# Patient Record
Sex: FEMALE | Race: OTHER | Employment: OTHER | URBAN - METROPOLITAN AREA
[De-identification: names, ages, dates, MRNs, and addresses within clinical notes are randomized per-mention and may not be internally consistent; named-entity substitution may affect disease eponyms.]

---

## 2017-01-05 PROBLEM — E05.90 HYPERTHYROIDISM: Status: ACTIVE | Noted: 2017-01-05

## 2017-01-05 PROBLEM — E04.2 MULTIPLE THYROID NODULES: Status: ACTIVE | Noted: 2017-01-05

## 2017-02-27 PROBLEM — M79.7 FIBROMYALGIA: Status: ACTIVE | Noted: 2017-02-27

## 2017-02-27 PROBLEM — E55.9 VITAMIN D DEFICIENCY: Status: ACTIVE | Noted: 2017-02-27

## 2017-02-27 PROBLEM — F32.A DEPRESSION: Status: ACTIVE | Noted: 2017-02-27

## 2017-02-27 PROBLEM — I01.1 RHEUMATOID AORTITIS: Status: ACTIVE | Noted: 2017-02-27

## 2017-02-27 PROBLEM — I10 HYPERTENSION: Status: ACTIVE | Noted: 2017-02-27

## 2017-02-27 PROBLEM — M85.80 OSTEOPENIA: Status: ACTIVE | Noted: 2017-02-27

## 2017-02-27 PROBLEM — K21.9 GERD (GASTROESOPHAGEAL REFLUX DISEASE): Status: ACTIVE | Noted: 2017-02-27

## 2017-02-27 PROBLEM — E78.5 HYPERLIPIDEMIA: Status: ACTIVE | Noted: 2017-02-27

## 2017-02-27 PROBLEM — M06.9 RHEUMATOID ARTHRITIS (HCC): Status: ACTIVE | Noted: 2017-02-27

## 2017-02-27 PROBLEM — E66.9 OBESE: Status: ACTIVE | Noted: 2017-02-27

## 2017-07-16 ENCOUNTER — HOSPITAL ENCOUNTER (EMERGENCY)
Age: 57
Discharge: HOME | End: 2017-07-16
Attending: EMERGENCY MEDICINE
Payer: MEDICARE

## 2017-07-16 ENCOUNTER — APPOINTMENT (OUTPATIENT)
Dept: CT IMAGING | Age: 57
End: 2017-07-16
Attending: EMERGENCY MEDICINE
Payer: MEDICARE

## 2017-07-16 VITALS
OXYGEN SATURATION: 99 % | SYSTOLIC BLOOD PRESSURE: 124 MMHG | HEIGHT: 60 IN | BODY MASS INDEX: 31.02 KG/M2 | HEART RATE: 62 BPM | TEMPERATURE: 98.2 F | RESPIRATION RATE: 16 BRPM | WEIGHT: 158 LBS | DIASTOLIC BLOOD PRESSURE: 72 MMHG

## 2017-07-16 DIAGNOSIS — H57.89 RED EYE: Primary | ICD-10-CM

## 2017-07-16 PROBLEM — H10.32 ACUTE CONJUNCTIVITIS OF LEFT EYE: Status: ACTIVE | Noted: 2017-07-16

## 2017-07-16 PROBLEM — R51.9 HEADACHE: Status: ACTIVE | Noted: 2017-07-16

## 2017-07-16 PROBLEM — R51.9 FACIAL PAIN, ACUTE: Status: ACTIVE | Noted: 2017-07-16

## 2017-07-16 PROBLEM — H54.7 DECREASED VISION: Status: ACTIVE | Noted: 2017-07-16

## 2017-07-16 LAB
ALBUMIN SERPL BCP-MCNC: 3.8 G/DL (ref 3.5–5)
ALBUMIN/GLOB SERPL: 0.9 {RATIO} (ref 1.2–3.5)
ALP SERPL-CCNC: 114 U/L (ref 50–136)
ALT SERPL-CCNC: 20 U/L (ref 12–65)
ANION GAP BLD CALC-SCNC: 9 MMOL/L (ref 7–16)
AST SERPL W P-5'-P-CCNC: 16 U/L (ref 15–37)
BASOPHILS # BLD AUTO: 0 K/UL (ref 0–0.2)
BASOPHILS # BLD: 0 % (ref 0–2)
BILIRUB SERPL-MCNC: 0.3 MG/DL (ref 0.2–1.1)
BUN SERPL-MCNC: 10 MG/DL (ref 6–23)
CALCIUM SERPL-MCNC: 9 MG/DL (ref 8.3–10.4)
CHLORIDE SERPL-SCNC: 107 MMOL/L (ref 98–107)
CO2 SERPL-SCNC: 28 MMOL/L (ref 21–32)
CREAT SERPL-MCNC: 0.74 MG/DL (ref 0.6–1)
DIFFERENTIAL METHOD BLD: ABNORMAL
EOSINOPHIL # BLD: 0.1 K/UL (ref 0–0.8)
EOSINOPHIL NFR BLD: 1 % (ref 0.5–7.8)
ERYTHROCYTE [DISTWIDTH] IN BLOOD BY AUTOMATED COUNT: 14.3 % (ref 11.9–14.6)
ERYTHROCYTE [SEDIMENTATION RATE] IN BLOOD: 10 MM/HR (ref 0–30)
GLOBULIN SER CALC-MCNC: 4.2 G/DL (ref 2.3–3.5)
GLUCOSE SERPL-MCNC: 89 MG/DL (ref 65–100)
HCT VFR BLD AUTO: 41.2 % (ref 35.8–46.3)
HGB BLD-MCNC: 13.8 G/DL (ref 11.7–15.4)
IMM GRANULOCYTES # BLD: 0 K/UL (ref 0–0.5)
IMM GRANULOCYTES NFR BLD AUTO: 0.2 % (ref 0–5)
LYMPHOCYTES # BLD AUTO: 36 % (ref 13–44)
LYMPHOCYTES # BLD: 2.2 K/UL (ref 0.5–4.6)
MCH RBC QN AUTO: 27.4 PG (ref 26.1–32.9)
MCHC RBC AUTO-ENTMCNC: 33.5 G/DL (ref 31.4–35)
MCV RBC AUTO: 81.9 FL (ref 79.6–97.8)
MONOCYTES # BLD: 0.5 K/UL (ref 0.1–1.3)
MONOCYTES NFR BLD AUTO: 7 % (ref 4–12)
NEUTS SEG # BLD: 3.5 K/UL (ref 1.7–8.2)
NEUTS SEG NFR BLD AUTO: 56 % (ref 43–78)
PLATELET # BLD AUTO: 253 K/UL (ref 150–450)
PMV BLD AUTO: 10.6 FL (ref 10.8–14.1)
POTASSIUM SERPL-SCNC: 3.8 MMOL/L (ref 3.5–5.1)
PROT SERPL-MCNC: 8 G/DL (ref 6.3–8.2)
RBC # BLD AUTO: 5.03 M/UL (ref 4.05–5.25)
SODIUM SERPL-SCNC: 144 MMOL/L (ref 136–145)
WBC # BLD AUTO: 6.3 K/UL (ref 4.3–11.1)

## 2017-07-16 PROCEDURE — 99284 EMERGENCY DEPT VISIT MOD MDM: CPT | Performed by: EMERGENCY MEDICINE

## 2017-07-16 PROCEDURE — 85652 RBC SED RATE AUTOMATED: CPT | Performed by: EMERGENCY MEDICINE

## 2017-07-16 PROCEDURE — 85025 COMPLETE CBC W/AUTO DIFF WBC: CPT | Performed by: EMERGENCY MEDICINE

## 2017-07-16 PROCEDURE — 80053 COMPREHEN METABOLIC PANEL: CPT | Performed by: EMERGENCY MEDICINE

## 2017-07-16 PROCEDURE — 70450 CT HEAD/BRAIN W/O DYE: CPT

## 2017-07-16 RX ORDER — TETRACAINE HYDROCHLORIDE 5 MG/ML
1 SOLUTION OPHTHALMIC
Status: DISCONTINUED | OUTPATIENT
Start: 2017-07-16 | End: 2017-07-16 | Stop reason: HOSPADM

## 2017-07-16 RX ORDER — ERYTHROMYCIN 5 MG/G
OINTMENT OPHTHALMIC
Qty: 1 TUBE | Refills: 0 | Status: SHIPPED | OUTPATIENT
Start: 2017-07-16 | End: 2017-07-19 | Stop reason: ALTCHOICE

## 2017-07-16 NOTE — ED PROVIDER NOTES
HPI Comments: Patient is a 58-year-old female with redness in her left eye. Patient states redness began yesterday morning, associated with some tenderness and pain to her left face and pain with eye movement. Denies any injuries or trauma, denies any fevers, no changes in vision, does endorse some nausea without vomiting, no further complaints. Patient is a 59 y.o. female presenting with eye irritation. The history is provided by the patient. No  was used. Red Eye    Associated symptoms include eye redness and pain. Pertinent negatives include no nausea, no vomiting, no weakness and no fever. Past Medical History:   Diagnosis Date    Depression 2/27/2017    Fibromyalgia     GERD (gastroesophageal reflux disease)     Hyperlipidemia     Hypertension     Hyperthyroidism     Multiple thyroid nodules 1/5/2017    Obese 2/27/2017    Osteopenia 2/27/2017    [facility]     Dr. Ayden Anthony Vitamin D deficiency 2/27/2017       Past Surgical History:   Procedure Laterality Date    HX APPENDECTOMY  2004    HX CARPAL TUNNEL RELEASE Bilateral 2004    HX HEMORRHOIDECTOMY      HX HYSTERECTOMY  2004    complete    HX OTHER SURGICAL      Hemangioma excision         Family History:   Problem Relation Age of Onset    Hypertension Mother     No Known Problems Father     Thyroid Disease Neg Hx     Diabetes Neg Hx        Social History     Social History    Marital status:      Spouse name: N/A    Number of children: N/A    Years of education: N/A     Occupational History    Not on file. Social History Main Topics    Smoking status: Never Smoker    Smokeless tobacco: Never Used    Alcohol use No    Drug use: No    Sexual activity: Not on file     Other Topics Concern    Not on file     Social History Narrative         ALLERGIES: Iohexol and Contrast agent [iodine]    Review of Systems   Constitutional: Negative for chills and fever.    HENT: Negative for rhinorrhea and sore throat. Eyes: Positive for pain and redness. Negative for visual disturbance. Respiratory: Negative for cough and shortness of breath. Cardiovascular: Negative for chest pain and leg swelling. Gastrointestinal: Negative for abdominal pain, diarrhea, nausea and vomiting. Genitourinary: Negative for dysuria. Musculoskeletal: Negative for back pain and neck pain. Skin: Negative for rash. Neurological: Negative for weakness and headaches. Psychiatric/Behavioral: The patient is not nervous/anxious. Vitals:    07/16/17 1438   BP: 155/76   Pulse: 68   Resp: 16   Temp: 98.3 °F (36.8 °C)   SpO2: 99%   Weight: 71.7 kg (158 lb)   Height: 5' (1.524 m)            Physical Exam   Constitutional: She is oriented to person, place, and time. She appears well-developed and well-nourished. HENT:   Head: Normocephalic. Right Ear: External ear normal.   Left Ear: External ear normal.   Eyes: Conjunctivae and EOM are normal. Pupils are equal, round, and reactive to light. Right eye exhibits no discharge. Left eye exhibits no discharge. Left eye with conjunctival redness, full range of motion of her eyes bilaterally, pupils equal and reactive to light, pupils accomodate appropriately, she does seem to have some pain with movement of her left eye from side to side    IOP 18 on right and 26 on left then 16 on repeat of left. Visual acuity intact 40/40 and negative for fluorosceine uptake. Neck: Normal range of motion. Neck supple. No tracheal deviation present. Cardiovascular: Normal rate, regular rhythm, normal heart sounds and intact distal pulses. No murmur heard. Pulmonary/Chest: Effort normal and breath sounds normal. No respiratory distress. Abdominal: Soft. There is no tenderness. Musculoskeletal: Normal range of motion. Neurological: She is alert and oriented to person, place, and time. No cranial nerve deficit.    Cn 2-12 fully intact, strength 5/5 in all extremities, negative pronator drift, ambulates without difficulty, no focal deficits appreciated other than some sensation deficits on left compared with right upper extremity which she states is \"definitely not new\" and contributes to her chronic fibromyalgia stating it has been like that for \"a long long time\". No acute focal deficits appreciated. Skin: No rash noted. Nursing note and vitals reviewed. MDM  Number of Diagnoses or Management Options  Red eye: new and requires workup     Amount and/or Complexity of Data Reviewed  Clinical lab tests: ordered and reviewed  Tests in the radiology section of CPT®: ordered and reviewed  Tests in the medicine section of CPT®: ordered and reviewed  Review and summarize past medical records: yes    Risk of Complications, Morbidity, and/or Mortality  Presenting problems: high  Diagnostic procedures: high  Management options: high    Patient Progress  Patient progress: stable    ED Course       Procedures    Recent Results (from the past 12 hour(s))   SED RATE, AUTOMATED    Collection Time: 07/16/17  4:53 PM   Result Value Ref Range    Sed rate, automated 10 0 - 30 mm/hr   CBC WITH AUTOMATED DIFF    Collection Time: 07/16/17  4:53 PM   Result Value Ref Range    WBC 6.3 4.3 - 11.1 K/uL    RBC 5.03 4.05 - 5.25 M/uL    HGB 13.8 11.7 - 15.4 g/dL    HCT 41.2 35.8 - 46.3 %    MCV 81.9 79.6 - 97.8 FL    MCH 27.4 26.1 - 32.9 PG    MCHC 33.5 31.4 - 35.0 g/dL    RDW 14.3 11.9 - 14.6 %    PLATELET 164 776 - 652 K/uL    MPV 10.6 (L) 10.8 - 14.1 FL    DF AUTOMATED      NEUTROPHILS 56 43 - 78 %    LYMPHOCYTES 36 13 - 44 %    MONOCYTES 7 4.0 - 12.0 %    EOSINOPHILS 1 0.5 - 7.8 %    BASOPHILS 0 0.0 - 2.0 %    IMMATURE GRANULOCYTES 0.2 0.0 - 5.0 %    ABS. NEUTROPHILS 3.5 1.7 - 8.2 K/UL    ABS. LYMPHOCYTES 2.2 0.5 - 4.6 K/UL    ABS. MONOCYTES 0.5 0.1 - 1.3 K/UL    ABS. EOSINOPHILS 0.1 0.0 - 0.8 K/UL    ABS. BASOPHILS 0.0 0.0 - 0.2 K/UL    ABS. IMM.  GRANS. 0.0 0.0 - 0.5 K/UL METABOLIC PANEL, COMPREHENSIVE    Collection Time: 07/16/17  4:53 PM   Result Value Ref Range    Sodium 144 136 - 145 mmol/L    Potassium 3.8 3.5 - 5.1 mmol/L    Chloride 107 98 - 107 mmol/L    CO2 28 21 - 32 mmol/L    Anion gap 9 7 - 16 mmol/L    Glucose 89 65 - 100 mg/dL    BUN 10 6 - 23 MG/DL    Creatinine 0.74 0.6 - 1.0 MG/DL    GFR est AA >60 >60 ml/min/1.73m2    GFR est non-AA >60 >60 ml/min/1.73m2    Calcium 9.0 8.3 - 10.4 MG/DL    Bilirubin, total 0.3 0.2 - 1.1 MG/DL    ALT (SGPT) 20 12 - 65 U/L    AST (SGOT) 16 15 - 37 U/L    Alk. phosphatase 114 50 - 136 U/L    Protein, total 8.0 6.3 - 8.2 g/dL    Albumin 3.8 3.5 - 5.0 g/dL    Globulin 4.2 (H) 2.3 - 3.5 g/dL    A-G Ratio 0.9 (L) 1.2 - 3.5       Ct Head Wo Cont    Result Date: 7/16/2017  CT of the head without contrast. CLINICAL INDICATION: Left eye redness and headache PROCEDURE: Serial thin section axial images are obtained from the cranial vertex through the skull base without the administration of intravenous contrast. Radiation dose reduction techniques were used for this study. Our CT scanners use one or all of the following: Automated exposure control, adjusted of the mA and/or kV according to patient size, iterative reconstruction COMPARISON: None. FINDINGS: There is no acute intracranial hemorrhage, mass, or mass effect. No abnormal extra-axial fluid collections identified. There is no hydrocephalus. The basilar cisterns are widely patent. The gray-white matter brain parenchymal interface is well-maintained. No skull fracture or aggressive osseous lesion noted. The mastoid air cells and included paranasal sinuses are clear.      IMPRESSION: Negative head CT       60 yo female with red eye:     Labs and imaging negative as above, IOP measured and without concern for acute glaucoma, eye stained with Fluorosceine and negative for uptake and no rash to face or concern for acute zoster at this time on my exam.  Sed rate normal.  Spoke with Dr. Sonya Lin who agrees feels this is very likely subconjunctival hemorrhage and will see patient first thing tomorrow for recheck and patient to return with any change or worsening vision (visual acuity intact), any nausea or vomiting, fevers or chills, drainage from eye, or any further concerns.

## 2017-07-16 NOTE — DISCHARGE INSTRUCTIONS
IF YOU DEVELOP CHANGES IN VISION, ANY RASH ON YOUR FACE AT ALL, ANY FEVERS, ANY DRAINAGE, ANY CHANGE OR WORSENING PAIN, ANY FURTHER CONCERNS THEN PLEASE RETURN IMMEDIATELY TO THE EMERGENCY DEPARTMENT. Subconjunctival Hemorrhage: Care Instructions  Your Care Instructions    Sometimes small blood vessels in the white of the eye can break, causing a red spot or speck. This is called a subconjunctival hemorrhage. The blood vessels may break when you sneeze, cough, vomit, strain, or bend over. Sometimes there is no clear cause. The blood may look alarming, especially if the spot is large. If there is no pain or vision change, there is usually no reason to worry, and the blood slowly will go away on its own in 2 to 3 weeks. Follow-up care is a key part of your treatment and safety. Be sure to make and go to all appointments, and call your doctor if you are having problems. It's also a good idea to know your test results and keep a list of the medicines you take. How can you care for yourself at home? · Watch for changes in your eye. It is normal for the red spot on your eyeball to change color as it heals. Just like a bruise on your skin, it may change from red to brown to purple to yellow. · Do not take aspirin or products that contain aspirin, which can increase bleeding. Use acetaminophen (Tylenol) if you need pain relief for another problem. · Do not take two or more pain medicines at the same time unless the doctor told you to. Many pain medicines have acetaminophen, which is Tylenol. Too much acetaminophen (Tylenol) can be harmful. When should you call for help? Call your doctor now or seek immediate medical care if:  · You have signs of an eye infection, such as:  ¨ Pus or thick discharge coming from the eye. ¨ Redness or swelling around the eye. ¨ A fever. · You see blood over the black part of your eye (pupil). · You have any changes or problems in your vision.   · You have any pain in your eye.  Watch closely for changes in your health, and be sure to contact your doctor if:  · You do not get better as expected. Where can you learn more? Go to http://kamini-maynor.info/. Enter H907 in the search box to learn more about \"Subconjunctival Hemorrhage: Care Instructions. \"  Current as of: 2017  Content Version: 11.3  © 9424-4430 [employer]. Care instructions adapted under license by [employer] (which disclaims liability or warranty for this information). If you have questions about a medical condition or this instruction, always ask your healthcare professional. [place] [address] any warranty or liability for your use of this information. Erythromycin (Into the eye)   Erythromycin ([id number])  Treats eye infections. Also treats or prevents eye infections in  infants. This medicine is a macrolide antibiotic. Brand Name(s): [employer] Rx Erythromycin   There may be other brand names for this medicine. When This Medicine Should Not Be Used: This medicine is not right for everyone. Do not use it if you had an allergic reaction to erythromycin. How to Use This Medicine:   Ointment  · Your doctor will tell you how much medicine to use. Do not use more than directed. · Wash your hands with soap and water before and after using this medicine. · To use the ointment: Hold the tip of the tube close to your eye with the other hand. Avoid touching the tip of the tube to your eye or finger. Squeeze a ribbon of ointment into the pocket between your lower lid and eyeball. Close your eyes for 1 to 2 minutes. Wipe the tip with a clean tissue and close the tube tightly. Keep the tube tightly closed when you are not using it. · Missed dose: Use the medicine as soon as you can. If it is almost time for your next dose, wait until then to use the medicine and skip the missed dose.  Do not use extra medicine to make up for a missed dose.  · Store the medicine at room temperature, away from heat and direct light. Do not freeze. Drugs and Foods to Avoid:   Ask your doctor or pharmacist before using any other medicine, including over-the-counter medicines, vitamins, and herbal products. · Some medicines can affect how erythromycin works. Tell your doctor if you are using any other products for your eye. Warnings While Using This Medicine:   · Tell your doctor if you are pregnant or breastfeeding, or if you have other eye problems such as glaucoma or cataracts. · Call your doctor if your symptoms do not improve or if they get worse. · Keep all medicine out of the reach of children. Never share your medicine with anyone. Possible Side Effects While Using This Medicine:   Call your doctor right away if you notice any of these side effects:  · Allergic reaction: Itching or hives, swelling in your face or hands, swelling or tingling in your mouth or throat, chest tightness, trouble breathing  · New or severe eye irritation  If you notice these less serious side effects, talk with your doctor:   · Mild eye redness or irritation  If you notice other side effects that you think are caused by this medicine, tell your doctor. Call your doctor for medical advice about side effects. You may report side effects to FDA at 8-669-FDA-2892  © 2017 [facility] Information is for End User's use only and may not be sold, redistributed or otherwise used for commercial purposes. The above information is an  only. It is not intended as medical advice for individual conditions or treatments. Talk to your doctor, nurse or pharmacist before following any medical regimen to see if it is safe and effective for you.

## 2018-08-13 PROBLEM — F41.9 ANXIETY AND DEPRESSION: Status: ACTIVE | Noted: 2018-08-13

## 2018-08-13 PROBLEM — F32.A ANXIETY AND DEPRESSION: Status: ACTIVE | Noted: 2018-08-13

## 2018-08-13 PROBLEM — F32.1 MDD (MAJOR DEPRESSIVE DISORDER), SINGLE EPISODE, MODERATE (HCC): Status: ACTIVE | Noted: 2018-08-13

## 2018-08-13 PROBLEM — Z86.59 HISTORY OF PANIC ATTACKS: Status: ACTIVE | Noted: 2018-08-13

## 2018-08-28 ENCOUNTER — HOSPITAL ENCOUNTER (OUTPATIENT)
Dept: MAMMOGRAPHY | Age: 58
Discharge: HOME | End: 2018-08-28
Payer: MEDICARE

## 2018-08-28 DIAGNOSIS — M89.9 DISORDER OF BONE AND CARTILAGE: ICD-10-CM

## 2018-08-28 DIAGNOSIS — M94.9 DISORDER OF BONE AND CARTILAGE: ICD-10-CM

## 2018-08-28 DIAGNOSIS — Z12.31 ENCOUNTER FOR SCREENING MAMMOGRAM FOR MALIGNANT NEOPLASM OF BREAST: ICD-10-CM

## 2018-08-28 PROCEDURE — 77080 DXA BONE DENSITY AXIAL: CPT

## 2018-08-28 PROCEDURE — 77067 SCR MAMMO BI INCL CAD: CPT

## 2019-03-29 ENCOUNTER — HOSPITAL ENCOUNTER (OUTPATIENT)
Dept: ULTRASOUND IMAGING | Age: 59
Discharge: HOME | End: 2019-03-29
Attending: INTERNAL MEDICINE
Payer: MEDICARE

## 2019-03-29 DIAGNOSIS — I73.9 RIGHT LEG CLAUDICATION (HCC): ICD-10-CM

## 2019-03-29 PROCEDURE — 93925 LOWER EXTREMITY STUDY: CPT

## 2019-04-09 ENCOUNTER — HOSPITAL ENCOUNTER (OUTPATIENT)
Dept: PHYSICAL THERAPY | Age: 59
Discharge: HOME | End: 2019-04-09
Attending: INTERNAL MEDICINE
Payer: MEDICARE

## 2019-04-09 DIAGNOSIS — G89.29 CHRONIC BILATERAL LOW BACK PAIN, WITH SCIATICA PRESENCE UNSPECIFIED: ICD-10-CM

## 2019-04-09 DIAGNOSIS — M54.2 NECK PAIN: ICD-10-CM

## 2019-04-09 DIAGNOSIS — M54.5 CHRONIC BILATERAL LOW BACK PAIN, WITH SCIATICA PRESENCE UNSPECIFIED: ICD-10-CM

## 2019-04-09 DIAGNOSIS — M79.7 FIBROMYALGIA: ICD-10-CM

## 2019-04-09 PROCEDURE — 97163 PT EVAL HIGH COMPLEX 45 MIN: CPT

## 2019-04-09 PROCEDURE — 97110 THERAPEUTIC EXERCISES: CPT

## 2019-04-09 NOTE — PROGRESS NOTES
Vidya Villanueva  : 1960  [place] / [place] / Product Type: Medicare /  [address]  at [facility]  [address], [facility] [address]  [phone]   [phone]      OUTPATIENT PHYSICAL THERAPY: Daily Treatment Note 2019  Diagnosis: neck pain, low back pain, firbromyalgia    Pre-treatment Symptoms/Complaints:  Pt reports pain in the neck worse than the low back. She wants to start therapy with the neck. Pain: Initial: Pain Intensity 1: 7  Post Session:  7-8/10   Medications Last Reviewed:  2019  Updated Objective Findings:  See evaluation note from today   TREATMENT:      Evaluation (x)    Therapeutic Exercise   (20 min ):  To decrease pain, improve flexibility and motion, and increase strength. Will provide verbal and manual cues as needed to ensure proper performance of the exercises. Will increase range of motion, resistance and intensity as pt tolerates. Pt was taught, and performed exercises for lumbar extn and cervical retraction extn. Did repeated reps of standing lumbar extn--demonstrated slight improvement in range with more reps. No increase in back pain with ex  Did prolonged prone on elbows. Decreased pain. Did not tolerate press ups due to wrist pain  Was able to do alternating knee flexion in prone  Did cervical retraction seated. Repeated reps did not decrease pain, but did improve ROM  Added extn---good, nearly full range of extn, without increase in pain    Therapeutic Modalities (     ):    HEP: As above; instructions given to patient for all exercises. Treatment/Session Summary:    · Response to Treatment:  pt reported that prone on elbows actually felt pretty good. · Communication/Consultation:  None today  · Equipment provided today:  None today   Provided HEP  · Recommendations/Intent for next treatment session: Next visit will focus on decreasing pain in the neck and low back. Posture education.   Treatment Plan of Care Effective Dates:  4-9-2019 to 7-4-2019  Total Treatment Billable Duration: 55 min  PT Patient Time In/Time Out  Time In: 9806  Time Out: Denise Do 20, PT  Visit number   1    Future Appointments   Date Time Provider Mk Glaser   4/11/2019  1:00 PM Jade Flores MD BSN [id number]   4/16/2019  4:00 PM Magdalene Treadwell, PT [facility]   4/18/2019  4:30 PM Magdalene Treadwell, PT [facility]   4/23/2019  1:45 PM Magdalene Treadwell, PT [facility]   4/25/2019  2:45 PM Magdalene Treadwell, PT [facility]   5/22/2019  3:40 PM Armando Neff MD [facility] [id number]

## 2019-04-09 NOTE — THERAPY EVALUATION
Star Neighbor  : 1960  [place] / Product Type: Medicare /  [address]  at [address], [address]  [phone]   [phone]       OUTPATIENT PHYSICAL THERAPY:Initial Assessment 2019   ICD-10: Treatment Diagnosis: M54.2   Cervicalgia  M54.5  Low back pain  M79.7 Fibromyalgia  Precautions/Allergies:   Iohexol and Contrast agent [iodine]   MD Orders: eval and treat MEDICAL/REFERRING DIAGNOSIS:  Neck pain [M54.2]  Fibromyalgia [M79.7]  Chronic bilateral low back pain, with sciatica presence unspecified [M54.5, G89.29]   DATE OF ONSET: chronic, much worse in the past several months  REFERRING PHYSICIAN: Juan Del Rosario MD  RETURN PHYSICIAN APPOINTMENT: TBD     INITIAL ASSESSMENT:  Ms. José Doan presents with referral for neck and low back pain and fibromyalgia. She reports that her neck pain is the worst and wanted to start with treatment there. From initial assessment it is difficult to tell if her pain is due to a mechanical dysfunction or due to the long standing fibromyalgia. She has never had PT in the past.  She is expected to benefit from skilled PT to help decrease her pain, improve general flexibility and muscle efficiency, improve posture and provide education to help maintain gains made in PT and help prevent or minimize future painful episodes. PROBLEM LIST (Impacting functional limitations):  1. Decreased Strength  2. Decreased ADL/Functional Activities  3. Decreased Ambulation Ability/Technique  4. Increased Pain  5. Decreased Activity Tolerance  6. Decreased [place] with Home Exercise Program INTERVENTIONS PLANNED: (Treatment may consist of any combination of the following)  1. Balance Exercise  2. Home Exercise Program (HEP)  3. Manual Therapy  4. Neuromuscular Re-education/Strengthening  5. Range of Motion (ROM)  6.  Therapeutic Exercise/Strengthening  7. Modalities as needed and appropriate, including pool or taping   TREATMENT PLAN:  Effective Dates: 4/9/2019 TO 7/4/2019 (90 days). Frequency/Duration: 2 times a week for 90 Day(s) and upon reassessment, will adjust frequency and duration as progress indicates. GOALS: (Goals have been discussed and agreed upon with patient.)  Short-Term Functional Goals: Time Frame: 3 weeks:   1. Establish independent initial HEP with no cueing. 2. Pt to report decrease in pain level to allow walking > 15 min without increase in sx  3. Provide education re: posture to help decrease spinal stress  Discharge Goals: Time Frame: 8 weeks     1. Improve score on NDI by 3 points     2. Pt to be able to walk > 20 min without increase in sx in neck or back pain     3. Decrease pain to allow pt to cook a meal without increase in neck or back pain     4. Pt to be independent in comprehensive HEP to help maintain gains made in PT    OUTCOME MEASURE:   Tool Used: Neck Disability Index (NDI)  Score:  Initial: 8/50  Most Recent: X/50 (Date: -- )   Interpretation of Score: The Neck Disability Index is a revised form of the Oswestry Low Back Pain Index and is designed to measure the activities of daily living in person's with neck pain. Each section is scored on a 0-5 scale, 5 representing the greatest disability. The scores of each section are added together for a total score of 50. MEDICAL NECESSITY:   · Patient is expected to demonstrate progress in strength, range of motion and pain management to help restore better quality of life with less pain and better tolerance to activity. .  REASON FOR SERVICES/OTHER COMMENTS:  · Patient continues to require skilled intervention due to the need for specific program of stretching and exercise to help manage her symptoms.   Total Duration:  PT Patient Time In/Time Out  Time In: 3702  Time Out: 9414    Rehabilitation Potential For Stated Goals: [address] [address] [facility] therapy, I certify that the treatment plan above will be carried out by a therapist or under their direction. Thank you for this referral,  Gene Edwards, DEAN     Referring Physician Signature: Glenn Walter MD     Referring Physician Signature: Glenn Walter MD          Date               PAIN/SUBJECTIVE:   Initial: Pain Intensity 1: 7  Post Session:  7/10   HISTORY:   History of Injury/Illness (Reason for Referral):  Pt reports that she has had fibromyalgia for several years, and also has pain in the neck, mostly R side, and the low back. She is referred for treatment for all 3 diagnoses, and she reported that the neck pain is the worst, so treatment will start there. She denies any specific incident that made her neck pain flare up. The pain is essentially constant, and goes down the entire arm to the lateral side of the hand. She reports frequent sensations as if the hand is asleep. She reports that turning her head to the R increases sx, but extn does not. She has had xrays but has not heard the results. Has taken Cymbalta for a long time, and the dose was increased recently due to this flare. Her pain is worse with prolonged walking, reading, household chores. The only thing that makes it better is rest.   Past Medical History/Comorbidities:   Ms. Patricia Somers  has a past medical history of Depression (2/27/2017), Fibromyalgia, GERD (gastroesophageal reflux disease), Hyperlipidemia, Hypertension, Hyperthyroidism, Multiple thyroid nodules (1/5/2017), Obese (2/27/2017), Osteopenia (2/27/2017), Rheumatoid arthritis ([facility] [address] and Vitamin D deficiency (2/27/2017). Ms. Patricia Somers  has a past surgical history that includes hx carpal tunnel release (Bilateral, 2004); hx hysterectomy (2004); hx hemorrhoidectomy; hx appendectomy (2004); hx other surgical; and hx endoscopy (2017). Social History/Living Environment:     lives with her daughter in [facility] on 2rd floor.     Prior Level of Function/Work/Activity:  On disability due to pain. Personal Factors:          Age:  62 y.o. Ambulatory/Rehab Services H2 Model Falls Risk Assessment   Risk Factors:       No Risk Factors Identified Ability to Rise from Chair:       (1)  Pushes up, successful in one attempt   Falls Prevention Plan:       No modifications necessary   Total: (5 or greater = High Risk): 1   ©2010 [facility] [employer]. All Rights Reserved. [facility] [employer] Patent #[id number]. Federal Law prohibits the replication, distribution or use without written permission from [facility] [place]   Current Medications:       Current Outpatient Medications:     simvastatin (ZOCOR) 20 mg tablet, Take 1 Tab by mouth nightly for 90 days. , Disp: 90 Tab, Rfl: 1    DULoxetine (CYMBALTA) 60 mg capsule, One daily  Indications: Disorder characterized by Stiff, Tender & Painful Muscles, Disp: 30 Cap, Rfl: 2    baclofen (LIORESAL) 10 mg tablet, Take 1 Tab by mouth three (3) times daily as needed (for muscle spasm). , Disp: 30 Tab, Rfl: 1    ergocalciferol (ERGOCALCIFEROL) 50,000 unit capsule, Take 1 Cap by mouth every seven (7) days. , Disp: 12 Cap, Rfl: 1    propranolol (INDERAL) 20 mg tablet, Take 1 Tab by mouth daily. , Disp: 90 Tab, Rfl: 1    ketotifen (ZADITOR) 0.025 % (0.035 %) ophthalmic solution, Administer 1 Drop to both eyes two (2) times a day for 90 days. , Disp: 5 mL, Rfl: 3    cetirizine (ZYRTEC) 10 mg tablet, Take 1 Tab by mouth nightly., Disp: 90 Tab, Rfl: 1    amLODIPine (NORVASC) 5 mg tablet, Take 1 Tab by mouth daily for 90 days. , Disp: 90 Tab, Rfl: 1    nitroglycerin (NITROSTAT) 0.3 mg SL tablet, , Disp: , Rfl: 1    meloxicam (MOBIC) 15 mg tablet, Take 1 Tab by mouth daily. , Disp: 30 Tab, Rfl: 0    dicyclomine (BENTYL) 10 mg capsule, Take 10 mg by mouth 4 times daily (before meals and nightly). , Disp: , Rfl:     omeprazole (PRILOSEC) 20 mg capsule, Take 20 mg by mouth daily. , Disp: , Rfl:    Date Last Reviewed:  4/10/2019 Number of Personal Factors/Comorbidities that affect the Plan of Care: 3+: HIGH COMPLEXITY        EXAMINATION:   Observation/Orthostatic Postural Assessment:          Pt is very short, moves easily from sit to stand. Moves all extremities without compensatory motions. General sitting posture is Fair, with slightly rounded shoulders and forward head. Pt's primary language is French and speaks through . ROM:        Cervical AROM:  Rotation R and L----min limitation, min pain with R rotation  Lateral bending:  Nil mckoy, no pain  Protraction: nil mckoy  Retraction:  Mod limitation, pain  Extn: mod limitation, no pain    Standing:  Flexion--nil. Can touch the floor  Extension: mod to min mckoy, causes min pain                                      Strength:     tested  strength:  R ( affected):  0                                         L= 32#                     Special Tests: none  Neurological Screen: pt reported frequent tingling and numbness in the R arm and hand, no specific dermatome or myotome pattern described. Not formally tested on initial assessment  Balance:  NT.   No balance problems observed with bending, transition movements      Body Structures Involved:  1. Nerves  2. Bones  3. Joints  4. Muscles Body Functions Affected:  1. Mental  2. Sensory/Pain  3. Neuromusculoskeletal  4. Movement Related Activities and Participation Affected:  1. General Tasks and Demands  2. Mobility  3. Self Care  4. Domestic Life  5.  Community, Social and [place]   Number of elements (examined above) that affect the Plan of Care: 3: MODERATE COMPLEXITY   CLINICAL PRESENTATION:   Presentation: Evolving clinical presentation with unstable and unpredictable characteristics: HIGH COMPLEXITY   CLINICAL DECISION MAKING:   Use of outcome tool(s) and clinical judgement create a POC that gives a: Difficult prediction of patient's progress: HIGH COMPLEXITY

## 2019-04-16 ENCOUNTER — HOSPITAL ENCOUNTER (OUTPATIENT)
Dept: PHYSICAL THERAPY | Age: 59
Discharge: HOME | End: 2019-04-16
Attending: INTERNAL MEDICINE
Payer: MEDICARE

## 2019-04-16 NOTE — PROGRESS NOTES
Vince Aguilar  : 1960  [place]  [place]: [place] Of [facility] Therapy  [address], [facility] [address]  [phone]   [phone]        OUTPATIENT DAILY NOTE    NAME/AGE/GENDER: Vince Aguilar is a 62 y.o. female. DATE: 2019    Patient called to cancel appointment today due to sickness. Will plan to follow up on next scheduled visit.     Srinivas Rizzo, PT

## 2019-04-18 ENCOUNTER — HOSPITAL ENCOUNTER (OUTPATIENT)
Dept: PHYSICAL THERAPY | Age: 59
Discharge: HOME | End: 2019-04-18
Attending: INTERNAL MEDICINE
Payer: MEDICARE

## 2019-04-19 NOTE — PROGRESS NOTES
Ceasar Guaman  : 1960  [place]  [place]: [place] Of [facility] [address], [facility] [address] [facility] [address]  [phone]   [phone]        OUTPATIENT DAILY NOTE    NAME/AGE/GENDER: Ceasar Guaman is a 62 y.o. female. DATE: 2019    Patient called to cancel appointment yesterday due to still being sick. Will plan to follow up on next scheduled visit.     Jamie Sandhu, PT

## 2019-04-23 ENCOUNTER — HOSPITAL ENCOUNTER (OUTPATIENT)
Dept: PHYSICAL THERAPY | Age: 59
Discharge: HOME | End: 2019-04-23
Attending: INTERNAL MEDICINE
Payer: MEDICARE

## 2019-04-23 PROCEDURE — 97110 THERAPEUTIC EXERCISES: CPT

## 2019-04-23 NOTE — PROGRESS NOTES
Michael Keith  : 1960  [place] / Product Type: Medicare /  [address]  at [place] [address] Therapy  [address], [address]  [phone]   [phone]      OUTPATIENT PHYSICAL THERAPY: Daily Treatment Note 2019  Diagnosis: neck pain, low back pain, firbromyalgia    Pre-treatment Symptoms/Complaints:  Pt reports that her neck pain is about the same. She has been bothered this week by vertigo and could not easily do many of the exercises given last week for her neck. Pain: Initial: Pain Intensity 1: 5  Post Session:  3 to 4ish/10   Medications Last Reviewed:  2019  Updated Objective Findings:   strength was still tested as not registering on dynamometer, but she was able to do gripping and pulling ex with  apparently functional forces. TREATMENT:          Therapeutic Exercise   (55 min ):  To decrease pain, improve flexibility and motion, and increase strength. Will provide verbal and manual cues as needed to ensure proper performance of the exercises. Will increase range of motion, resistance and intensity as pt tolerates. Nustep x 8 min, with all 4s  Reassessed cervical motion--still moderately limited for rotation. Worked on retraction, with min asst, in sitting. Could not progress to extn due to increase in vertigo sx  Pt worked with red therabar--pronation and supination, twisting, holding against resistance  bilat UE ex in standing with yellow sportscord--shoulder extn, rows, punch---all 1 x 12  Did prolonged prone on elbows. Tried to work on retraction from prone--not very effective. .  Did not tolerate press ups due to wrist pain  Was able to do alternating knee flexion in prone  Did cervical retraction seated. Repeated reps did not decrease pain, but did improve ROM      Therapeutic Modalities (     ):    HEP: As above; instructions given to patient for all exercises.  Gave pt green theraband for UE ex at home    Treatment/Session Summary:  Pt tolerated therapy well today. With her fibromyalgia, it is difficult to tease out all of her pain and her responses to therapy. She does seem to think that she is getting better little by little. She has tried to be compliant with exercises from PT but that was complicated by new onset of vertigo. She is expected to continue to benefit from PT to help decrease and manage her painful sx, and improve functional strength. · Response to Treatment:  pt reported that prone on elbows actually felt pretty good. · Communication/Consultation:  None today  · Equipment provided today:  green theraband     · Recommendations/Intent for next treatment session: Next visit will focus on decreasing pain in the neck and low back. Posture education.   Treatment Plan of Care Effective Dates:  4-9-2019 to 7-4-2019  Total Treatment Billable Duration: 55 min  PT Patient Time In/Time Out  Time In: 0145  Time Out: [address], PT  Visit number   1    Future Appointments   Date Time Provider Mk Glaser   4/25/2019  2:45 PM Jennifer Philip PT [facility]   5/22/2019  3:40 PM Cristy Hood MD [address]

## 2019-04-25 ENCOUNTER — HOSPITAL ENCOUNTER (OUTPATIENT)
Dept: PHYSICAL THERAPY | Age: 59
Discharge: HOME | End: 2019-04-25
Attending: INTERNAL MEDICINE
Payer: MEDICARE

## 2019-04-25 PROCEDURE — 97110 THERAPEUTIC EXERCISES: CPT

## 2019-04-25 NOTE — PROGRESS NOTES
Migel Cage  : 1960  [place] / Product Type: Medicare /  [address]  at [place] [address] Therapy  [address], [address]  [phone]   [phone]      OUTPATIENT PHYSICAL THERAPY: Daily Treatment Note 2019  Diagnosis: neck pain, low back pain, fibromyalgia    Pre-treatment Symptoms/Complaints:  Pt reports that her neck pain is about the same. Vertigo has been much better. She reported that she has been some better about her exercises. Pain: Initial: Pain Intensity 1: 5  Post Session:  3 to 4ish/10   Medications Last Reviewed:  2019  Updated Objective Findings:  None Today   TREATMENT:      Therapeutic Exercise   (55 min ):  To decrease pain, improve flexibility and motion, and increase strength. Will provide verbal and manual cues as needed to ensure proper performance of the exercises. Will increase range of motion, resistance and intensity as pt tolerates. UBE x 6 min  Reassessed cervical motion--still moderately limited for rotation. Pt worked with red therabar--pronation and supination, with arms out in front  Received PROM for both shoulders  Did work on hips and low back due to c/o back pain:  LTR, knee to chest.  Tried bridging--could not do this  Supine marching--ok but much more difficult with R hip  Hip abd/add agasint manual resistance in hooklying--not great quality for this ex  bilat UE ex in standing with yellow sportscord--shoulder extn, rows, punch---all 1 x 12  Step ups to 6\" step--1 x 15 each---no specific pain in R hip or leg with steps  Lateral step downs--1 x 15 each      Therapeutic Modalities (     ):    HEP: As above; instructions given to patient for all exercises. Pt has  green theraband for UE ex at home    Treatment/Session Summary:  Pt tolerated therapy well today. With her fibromyalgia, it is difficult to tease out all of her pain and her responses to therapy. Treatment today worked more on the trunk and core than the neck, and she seemed to appreciate that. She does seem to think that she is getting better little by little. She has tried to be compliant with exercises from PT but that was complicated by new onset of vertigo. She is expected to continue to benefit from PT to help decrease and manage her painful sx, and improve functional strength. R hand and arm are stil lnoticeably weaker than L and R leg has more pain and dysfunction than L.  · Response to Treatment:  she seemed to like the standing LE ex. · Communication/Consultation:  None today  · Equipment provided today:  None today     · Recommendations/Intent for next treatment session: Next visit will focus on decreasing pain in the neck and low back. Posture education.   Treatment Plan of Care Effective Dates:  4-9-2019 to 7-4-2019  Total Treatment Billable Duration: 55 min  PT Patient Time In/Time Out  Time In: 0250  Time Out: [address], PT  Visit number   3    Future Appointments   Date Time Provider Mk Glaser   4/30/2019  1:45 PM Virgie Paredes PT [facility]   5/2/2019  4:15 PM Aubrey Scruggs, PT [facility]   5/7/2019  1:00 PM Aubrey Louis, PT [facility]   5/9/2019  3:30 PM Aubrey Louis, PT [facility]   5/22/2019  3:40 PM Alida Goodwin MD [address]

## 2019-04-30 ENCOUNTER — HOSPITAL ENCOUNTER (OUTPATIENT)
Dept: PHYSICAL THERAPY | Age: 59
Discharge: HOME | End: 2019-04-30
Attending: INTERNAL MEDICINE
Payer: MEDICARE

## 2019-04-30 NOTE — PROGRESS NOTES
Ashley Gustafson  : 1960  [place]  [place] Of [facility] Therapy  [address], [facility] [address]  [phone]   [phone]        OUTPATIENT DAILY NOTE    NAME/AGE/GENDER: Ashley Gustafson is a 62 y.o. female. DATE: 2019    Patient called to cancel appointment today due to  No reason given. Will plan to follow up on next scheduled visit.     Alecia Harvey, PT

## 2019-05-02 ENCOUNTER — HOSPITAL ENCOUNTER (OUTPATIENT)
Dept: PHYSICAL THERAPY | Age: 59
Discharge: HOME | End: 2019-05-02
Attending: INTERNAL MEDICINE
Payer: MEDICARE

## 2019-05-02 NOTE — PROGRESS NOTES
Ashley Gustafson  : 1960  [place]  [place] Of [facility] Therapy  [address], [facility] [address]  [phone]   [phone]        OUTPATIENT DAILY NOTE    NAME/AGE/GENDER: Ashley Gustafson is a 62 y.o. female. DATE: 2019    Patient called to cancelr appointment today due to her vertigo being worse today. Will plan to follow up on next scheduled visit.     Alecia Harvey, PT

## 2019-05-07 ENCOUNTER — HOSPITAL ENCOUNTER (OUTPATIENT)
Dept: PHYSICAL THERAPY | Age: 59
Discharge: HOME | End: 2019-05-07
Attending: INTERNAL MEDICINE
Payer: MEDICARE

## 2019-05-07 PROCEDURE — 97110 THERAPEUTIC EXERCISES: CPT

## 2019-05-07 NOTE — PROGRESS NOTES
Jailene Box  : 1960  [place] / Product Type: Medicare /  [address]  at [place] [address] Therapy  [address], [address]  [phone]   [phone]      OUTPATIENT PHYSICAL THERAPY: Daily Treatment Note 2019  Diagnosis: neck pain, low back pain, fibromyalgia    Pre-treatment Symptoms/Complaints:  Pt reports that her neck pain is some better, but her overall body pain seems worse. She indicates moderate pain in the R shoulder and inside of R elbow. Her vertigo is better. Pain: Initial: Pain Intensity 1: 4  Post Session:  3 to 2 wisam/10   Medications Last Reviewed:  2019  Updated Objective Findings:  None Today   TREATMENT:      Therapeutic Exercise   (60 min ):  To decrease pain, improve flexibility and motion, and increase strength. Will provide verbal and manual cues as needed to ensure proper performance of the exercises. Will increase range of motion, resistance and intensity as pt tolerates. Nustep x 16  min  Standing hip abd--19#--1 x 12  Standing hip flex--19#--1 x 12  Standing hip extn--37.5#---1 x 15  Resisted walking--30#--12 trips forward and backward  Standing trunk rotation--yellow sportscord--12 reps each direction    HEP: As above; instructions given to patient for all exercises. Pt has  green theraband for UE ex at home    Treatment/Session Summary:  Pt tolerated therapy well today. She seemed to appreciate the exercise she could do on the Nustep, without any increase in pain. With her fibromyalgia, it is difficult to tease out all of her pain and her responses to therapy. Treatment today worked more on the trunk and core than the neck, and she seemed to appreciate that. She does seem to think that she is getting better little by little. She has tried to be compliant with exercises from PT and now that her vertigo is better she can do that.     She is expected to continue to benefit from PT to help decrease and manage her painful sx, and improve functional strength. R hand and arm are still noticeably weaker than L and R leg has more pain and dysfunction than L.  · Response to Treatment:  she seemed to enjoy the challenge of the resisted walking, and could do that without any pain. · Communication/Consultation:  None today  · Equipment provided today:  None today     · Recommendations/Intent for next treatment session: Next visit will focus on decreasing pain in the neck and low back. Posture education.   Treatment Plan of Care Effective Dates:  4-9-2019 to 7-4-2019  Total Treatment Billable Duration: 60 min  PT Patient Time In/Time Out  Time In: 0100  Time Out: 0200  Felipa Rodriguez, PT  Visit number   4    Future Appointments   Date Time Provider Mk Glaser   5/9/2019  3:30 PM Santos Burroughs, [facility]   5/14/2019  1:00 PM Cooper Scruggs, [facility]   5/21/2019  1:00 PM Cooper Scruggs, PT [facility]

## 2019-05-09 ENCOUNTER — HOSPITAL ENCOUNTER (OUTPATIENT)
Dept: PHYSICAL THERAPY | Age: 59
Discharge: HOME | End: 2019-05-09
Attending: INTERNAL MEDICINE
Payer: MEDICARE

## 2019-05-09 PROCEDURE — 97110 THERAPEUTIC EXERCISES: CPT

## 2019-05-09 NOTE — PROGRESS NOTES
Michael [place]  : 1960  [place] / Product Type: Medicare /  [address]  at [facility] Therapy  [address], [facility] [address]  [phone]   [phone]      OUTPATIENT PHYSICAL THERAPY: Daily Treatment Note 2019  Diagnosis: neck pain, low back pain, fibromyalgia    Pre-treatment Symptoms/Complaints:  Pt reports that her neck pain is some better. Her vertigo is much better. She feels some better overall. Pain: Initial: Pain Intensity 1: 4  Post Session:  3 to 2 wisam/10   Medications Last Reviewed:  2019  Updated Objective Findings:  Able to walk about 1 mile today without too much pain   TREATMENT:      Therapeutic Exercise   (45 min ):  To decrease pain, improve flexibility and motion, and increase strength. Will provide verbal and manual cues as needed to ensure proper performance of the exercises. Will increase range of motion, resistance and intensity as pt tolerates. Nustep x 6  Min  Standing balance on foam half roller, both sides  Standing on foam, biceps curls with 3#  Bridging with blue theraband at knees for abd--1 x 12  Trunk extn with LEs on ball  Standing hip abd--19#--1 x 12  Standing hip flex--19#--1 x 12  Standing hip extn--43#---1 x 15      HEP: As above; instructions given to patient for all exercises. Pt has  green theraband for UE ex at home. Gave pt blue theraband    Treatment/Session Summary:  Pt tolerated therapy well today. With her fibromyalgia, it is difficult to tease out all of her pain and her responses to therapy. Treatment today worked on the trunk and core, and she seemed to appreciate that. She does seem to think that she is getting better little by little. She was able to walk about a mile at the park without significant increase in sx. She has tried to be compliant with exercises from PT and now that her vertigo is better she can do that.     She is expected to continue to benefit from PT to help decrease and manage her painful sx, and improve functional strength. R hand and arm are still noticeably weaker than L and R leg has more pain and dysfunction than L.  · Response to Treatment:  she seemed to enjoy the challenge of the resisted walking, and could do that without any pain. · Communication/Consultation:  None today  · Equipment provided today:  None today     · Recommendations/Intent for next treatment session: Next visit will focus on decreasing pain in the neck and low back. Posture education.   Treatment Plan of Care Effective Dates:  4-9-2019 to 7-4-2019  Total Treatment Billable Duration: 45 min  PT Patient Time In/Time Out  Time In: 0340  Time Out: [facility], PT  Visit number   5    Future Appointments   Date Time Provider Mk Glaser   5/14/2019  1:00 PM Remington Scruggs, PT [facility]   5/21/2019  1:00 PM Remington Scruggs, PT [facility]

## 2019-05-14 ENCOUNTER — HOSPITAL ENCOUNTER (OUTPATIENT)
Dept: PHYSICAL THERAPY | Age: 59
Discharge: HOME | End: 2019-05-14
Attending: INTERNAL MEDICINE
Payer: MEDICARE

## 2019-05-14 NOTE — PROGRESS NOTES
Roz Obrien  : 1960  [place]  [place] [facility]  [address], [address]  [phone]   [phone]        OUTPATIENT DAILY NOTE    NAME/AGE/GENDER: Roz Obrien is a 62 y.o. female. DATE: 2019    Patient called to cancel appointment today due to spraining her ankle. Will plan to follow up on next scheduled visit.     Gene Edwards, PT

## 2019-05-21 ENCOUNTER — HOSPITAL ENCOUNTER (OUTPATIENT)
Dept: PHYSICAL THERAPY | Age: 59
Discharge: HOME | End: 2019-05-21
Attending: INTERNAL MEDICINE
Payer: MEDICARE

## 2019-05-21 PROCEDURE — 97110 THERAPEUTIC EXERCISES: CPT

## 2019-05-21 NOTE — PROGRESS NOTES
Davina Wing  : 1960  [place] / Product Type: Medicare /  [address]  at [place] [address] Therapy  [address], [address]  [phone]   [phone]      OUTPATIENT PHYSICAL THERAPY: Daily Treatment Note 2019  Diagnosis: neck pain, low back pain, fibromyalgia    Pre-treatment Symptoms/Complaints:  Pt reports that her neck pain is significantly better. She did not mention vertigo today. She feels some better overall and thinks today should be her last PT visit. Pain: Initial: Pain Intensity 1: 3  Post Session:  2 to 1  wisam/10   Medications Last Reviewed:  2019  Updated Objective Findings:  pain   TREATMENT:      Therapeutic Exercise   (55 min ):  To decrease pain, improve flexibility and motion, and increase strength. Will provide verbal and manual cues as needed to ensure proper performance of the exercises. Will increase range of motion, resistance and intensity as pt tolerates. Treadmill x 6  Min  Standing balance on foam half roller, both sides  Standing on foam, biceps curls with 3#  Wall ball slides--2 x 15,  Good form  Lots of core ex while sitting on physioball--bouncing, circles, raising 1 leg, resistance against green theraband, shoulder flex and biceps curls against green theraband anchored under her feet  Resisted walking--40#--10 reps forward/backward  Standing hip abd--19#--1 x 12  Standing hip flex--19#--1 x 12  Standing hip extn--43#---1 x 15      HEP: As above; instructions given to patient for all exercises. Pt has green theraband for UE ex at home. Gave pt more green theraband for LE ex    · Treatment/Session Summary:  Pt did very well in therapy today. Treatment today worked on the trunk and core, and she seemed to appreciate that. She reports that she is getting better little by little. She was able to walk about a mile at the park without significant increase in sx.    She has tried to be compliant with exercises from PT and now that her vertigo is better she can do that. She says she  feels much better overall, even though her NDI was much worse. ·   · Response to Treatment:  she seemed to enjoy the challenge of the resisted walking, and could do that without any pain. · Communication/Consultation:  None today  · Equipment provided today:  None today     · Pt is Dc'd today. Treatment Plan of Care Effective Dates:  4-9-2019 to 7-4-2019  Total Treatment Billable Duration: 55 min  PT Patient Time In/Time Out  Time In: 0100  Time Out: 0200  Shilpa Alford, PT  Visit number   6    No future appointments.

## 2019-05-22 NOTE — THERAPY DISCHARGE
Lelo Larson  : 1960  [place] / Product Type: Medicare /  [address]  at [facility]  [address], [address]  [phone]   [phone]       OUTPATIENT PHYSICAL THERAPY:Discharge Summary   2019   ICD-10: Treatment Diagnosis: M54.2   Cervicalgia  M54.5  Low back pain  M79.7 Fibromyalgia  Precautions/Allergies:   Iohexol and Contrast agent [iodine]   MD Orders: eval and treat MEDICAL/REFERRING DIAGNOSIS:  Cervicalgia [M54.2]  Fibromyalgia [M79.7]  Low back pain [M54.5]  Other chronic pain [G89.29]   DATE OF ONSET: chronic, much worse in the past several months  REFERRING PHYSICIAN: Marilyn Boykin MD  RETURN PHYSICIAN APPOINTMENT: TBD      ASSESSMENT:  Ms. Fatou Flores presents with referral for neck and low back pain and fibromyalgia. She reports that her neck pain is the worst and wanted to start with treatment there. From initial assessment it is difficult to tell if her pain is due to a mechanical dysfunction or due to the long standing fibromyalgia. She has never had PT in the past.  She is expected to benefit from skilled PT to help decrease her pain, improve general flexibility and muscle efficiency, improve posture and provide education to help maintain gains made in PT and help prevent or minimize future painful episodes. -- Discharge   Pt attended 6 visits to PT initially for neck pain worse than back pain, and as neck pain was better therapy addressed more of her back pain. At time of DC she had full painfree ROM of the neck, and her headaches were much more infrequent and intense. She also had less back pain, but her R arm and leg remained slightly weaker than the L, presumably due to fibromyalgia pain. She had resumed walking in her neighborhood and can walk a mile without pain.     The NDI she filled out at [facility] was worse than initially, making me suspect her initial reporting was way inaccurate. Her treatments were all done with help from . Pt feel ready for DC, and therapy is over today. PROBLEM LIST (Impacting functional limitations):  1. Decreased Strength  2. Decreased ADL/Functional Activities  3. Decreased Ambulation Ability/Technique  4. Increased Pain  5. Decreased Activity Tolerance  6. Decreased [place] with Home Exercise Program INTERVENTIONS PLANNED: (Treatment may consist of any combination of the following)  1. Balance Exercise  2. Home Exercise Program (HEP)  3. Manual Therapy  4. Neuromuscular Re-education/Strengthening  5. Range of Motion (ROM)  6. Therapeutic Exercise/Strengthening  7. Modalities as needed and appropriate, including pool or taping   TREATMENT PLAN:  Effective Dates: 4/9/2019 TO 7/4/2019 (90 days). Frequency/Duration: Pt is DC'd from therapy today. GOALS: (Goals have been discussed and agreed upon with patient.)  Short-Term Functional Goals: Time Frame: 3 weeks:--- all STGS were met   1. Establish independent initial HEP with no cueing. 2. Pt to report decrease in pain level to allow walking > 15 min without increase in sx  3. Provide education re: posture to help decrease spinal stress  Discharge Goals: Time Frame: 8 weeks     1. Improve score on NDI by 3 points-- not met     2. Pt to be able to walk > 20 min without increase in sx in neck or back pain---goal met     3. Decrease pain to allow pt to cook a meal without increase in neck or back pain--goal met     4. Pt to be independent in comprehensive HEP to help maintain gains made in PT--goal met    OUTCOME MEASURE:   Tool Used: Neck Disability Index (NDI)  Score:  Initial: 8/50  Most Recent: 23/50 (Date: 5-23 )   Interpretation of Score: The Neck Disability Index is a revised form of the Oswestry Low Back Pain Index and is designed to measure the activities of daily living in person's with neck pain.   Each section is scored on a 0-5 scale, 5 representing the greatest disability. The scores of each section are added together for a total score of 50. ·   Total Duration:  PT Patient Time In/Time Out  Time In: 0100  Time Out: 0200      Thank you for this referral,  Jessica Scruggs, PT              PAIN/SUBJECTIVE:   Initial: Pain Intensity 1: 3  Post Session:  7/10   HISTORY:   History of Injury/Illness (Reason for Referral):  Pt reports that she has had fibromyalgia for several years, and also has pain in the neck, mostly R side, and the low back. She is referred for treatment for all 3 diagnoses, and she reported that the neck pain is the worst, so treatment will start there. She denies any specific incident that made her neck pain flare up. The pain is essentially constant, and goes down the entire arm to the lateral side of the hand. She reports frequent sensations as if the hand is asleep. She reports that turning her head to the R increases sx, but extn does not. She has had xrays but has not heard the results. Has taken Cymbalta for a long time, and the dose was increased recently due to this flare. Her pain is worse with prolonged walking, reading, household chores. The only thing that makes it better is rest.   Past Medical History/Comorbidities:   Ms. Mynor Barboza  has a past medical history of Depression (2/27/2017), Fibromyalgia, GERD (gastroesophageal reflux disease), Hyperlipidemia, Hypertension, Hyperthyroidism, Multiple thyroid nodules (1/5/2017), Obese (2/27/2017), Osteopenia (2/27/2017), Rheumatoid arthritis ([facility] [address] and Vitamin D deficiency (2/27/2017). Ms. Mynor Barboza  has a past surgical history that includes hx carpal tunnel release (Bilateral, 2004); hx hysterectomy (2004); hx hemorrhoidectomy; hx appendectomy (2004); hx other surgical; and hx endoscopy (2017). Social History/Living Environment:     lives with her daughter in apt on 2rd floor. Prior Level of Function/Work/Activity:  On disability due to pain. Personal Factors:          Age:  62 y.o. Ambulatory/Rehab Services H2 Model Falls Risk Assessment   Risk Factors:       No Risk Factors Identified Ability to Rise from Chair:       (1)  Pushes up, successful in one attempt   Falls Prevention Plan:       No modifications necessary   Total: (5 or greater = High Risk): 1   ©2010 [facility]. All Rights Reserved. [facility] Patent #[id number]. Federal Law prohibits the replication, distribution or use without written permission from [facility] of [address] [place] [address]   Current Medications:       Current Outpatient Medications:     simvastatin (ZOCOR) 20 mg tablet, Take 1 Tab by mouth nightly for 90 days. , Disp: 90 Tab, Rfl: 1    DULoxetine (CYMBALTA) 60 mg capsule, One daily  Indications: Disorder characterized by Stiff, Tender & Painful Muscles, Disp: 30 Cap, Rfl: 2    baclofen (LIORESAL) 10 mg tablet, Take 1 Tab by mouth three (3) times daily as needed (for muscle spasm). , Disp: 30 Tab, Rfl: 1    ergocalciferol (ERGOCALCIFEROL) 50,000 unit capsule, Take 1 Cap by mouth every seven (7) days. , Disp: 12 Cap, Rfl: 1    propranolol (INDERAL) 20 mg tablet, Take 1 Tab by mouth daily. , Disp: 90 Tab, Rfl: 1    ketotifen (ZADITOR) 0.025 % (0.035 %) ophthalmic solution, Administer 1 Drop to both eyes two (2) times a day for 90 days. , Disp: 5 mL, Rfl: 3    cetirizine (ZYRTEC) 10 mg tablet, Take 1 Tab by mouth nightly., Disp: 90 Tab, Rfl: 1    amLODIPine (NORVASC) 5 mg tablet, Take 1 Tab by mouth daily for 90 days. , Disp: 90 Tab, Rfl: 1    nitroglycerin (NITROSTAT) 0.3 mg SL tablet, , Disp: , Rfl: 1    meloxicam (MOBIC) 15 mg tablet, Take 1 Tab by mouth daily. , Disp: 30 Tab, Rfl: 0    dicyclomine (BENTYL) 10 mg capsule, Take 10 mg by mouth 4 times daily (before meals and nightly). , Disp: , Rfl:     omeprazole (PRILOSEC) 20 mg capsule, Take 20 mg by mouth daily. , Disp: , Rfl:    Date Last Reviewed:  5/22/2019     Number of Personal Factors/Comorbidities that affect the Plan of Care: 3+: HIGH COMPLEXITY EXAMINATION:   Observation/Orthostatic Postural Assessment:          Pt is very short, moves easily from sit to stand. Moves all extremities without compensatory motions. General sitting posture is Fair, with slightly rounded shoulders and forward head. Pt's primary language is Persian and speaks through . ROM:        Cervical AROM:  Rotation R and L----min limitation, min pain with R rotation  Lateral bending:  Nil mckoy, no pain  Protraction: nil mckoy  Retraction:  Mod limitation, pain  Extn: mod limitation, no pain    Standing:  Flexion--nil. Can touch the floor  Extension: mod to min mckoy, causes min pain                                      Strength:     tested  strength:  R ( affected):  0                                         L= 32#                     Special Tests: none  Neurological Screen: pt reported frequent tingling and numbness in the R arm and hand, no specific dermatome or myotome pattern described. Not formally tested on initial assessment  Balance:  NT.   No balance problems observed with bending, transition movements      Body Structures Involved:  1. Nerves  2. Bones  3. Joints  4. Muscles Body Functions Affected:  1. Mental  2. Sensory/Pain  3. Neuromusculoskeletal  4. Movement Related Activities and Participation Affected:  1. General Tasks and Demands  2. Mobility  3. Self Care  4. Domestic Life  5.  Community, Social and [place]   Number of elements (examined above) that affect the Plan of Care: 3: MODERATE COMPLEXITY   CLINICAL PRESENTATION:   Presentation: Evolving clinical presentation with unstable and unpredictable characteristics: HIGH COMPLEXITY   CLINICAL DECISION MAKING:   Use of outcome tool(s) and clinical judgement create a POC that gives a: Difficult prediction of patient's progress: HIGH COMPLEXITY

## 2020-02-03 PROBLEM — E05.90 HYPERTHYROIDISM: Status: RESOLVED | Noted: 2017-01-05 | Resolved: 2020-02-03

## 2020-02-28 ENCOUNTER — HOSPITAL ENCOUNTER (OUTPATIENT)
Dept: GENERAL RADIOLOGY | Age: 60
Discharge: HOME | End: 2020-02-28
Payer: MEDICARE

## 2020-02-28 DIAGNOSIS — M79.641 PAIN IN BOTH HANDS: ICD-10-CM

## 2020-02-28 DIAGNOSIS — M79.642 PAIN IN BOTH HANDS: ICD-10-CM

## 2020-02-28 PROCEDURE — 73130 X-RAY EXAM OF HAND: CPT

## 2020-03-02 NOTE — PROGRESS NOTES
Dear Ms. Morris    Your recent XR left hand :  IMPRESSION:     1. No evidence of acute fracture or dislocation the bilateral hands     2. Degenerative changes favor osteoarthritis and are as detailed above.     Thanks,  Dr. Degroot Mean

## 2020-03-04 NOTE — PROGRESS NOTES
Dear Ms. Morris    IMPRESSION:     1. No evidence of acute fracture or dislocation the bilateral hands     2. Degenerative changes favor osteoarthritis and are as detailed below:. Your recent RIGHT HAND:     Mild chronic deformity of the fifth metacarpal. Small osteophytes at the first carpal metacarpal articulation. Otherwise mild scattered joint space narrowing. No evidence of an osseous erosive process. No evidence of acute fracture or dislocation. No radiopaque foreign body.     LEFT HAND:     Small osteophytes at the first carpal metacarpal articulation. Likely subchondral cyst within the scaphoid. Minimal scattered interphalangeal joint space narrowing. No evidence of acute fracture or dislocation.  No radiopaque foreign body.     Thanks,  Dr. Jonna Salazar

## 2021-03-30 PROBLEM — Z86.39 HISTORY OF HYPERTHYROIDISM: Status: ACTIVE | Noted: 2021-03-30

## 2022-03-10 ENCOUNTER — TRANSCRIBE ORDER (OUTPATIENT)
Dept: SCHEDULING | Age: 62
End: 2022-03-10

## 2022-03-10 DIAGNOSIS — Z12.31 VISIT FOR SCREENING MAMMOGRAM: Primary | ICD-10-CM

## 2022-03-10 DIAGNOSIS — Z78.0 MENOPAUSE: ICD-10-CM

## 2022-03-18 PROBLEM — M06.9 RHEUMATOID ARTHRITIS (HCC): Status: ACTIVE | Noted: 2017-02-27

## 2022-03-18 PROBLEM — E55.9 VITAMIN D DEFICIENCY: Status: ACTIVE | Noted: 2017-02-27

## 2022-03-18 PROBLEM — M85.80 OSTEOPENIA: Status: ACTIVE | Noted: 2017-02-27

## 2022-03-18 PROBLEM — E78.5 HYPERLIPIDEMIA: Status: ACTIVE | Noted: 2017-02-27

## 2022-03-18 PROBLEM — K21.9 GERD (GASTROESOPHAGEAL REFLUX DISEASE): Status: ACTIVE | Noted: 2017-02-27

## 2022-03-18 PROBLEM — F32.A DEPRESSION: Status: ACTIVE | Noted: 2017-02-27

## 2022-03-18 PROBLEM — M79.7 FIBROMYALGIA: Status: ACTIVE | Noted: 2017-02-27

## 2022-03-18 PROBLEM — H54.7 DECREASED VISION: Status: ACTIVE | Noted: 2017-07-16

## 2022-03-19 PROBLEM — H10.32 ACUTE CONJUNCTIVITIS OF LEFT EYE: Status: ACTIVE | Noted: 2017-07-16

## 2022-03-19 PROBLEM — Z86.59 HISTORY OF PANIC ATTACKS: Status: ACTIVE | Noted: 2018-08-13

## 2022-03-19 PROBLEM — I10 HYPERTENSION: Status: ACTIVE | Noted: 2017-02-27

## 2022-03-19 PROBLEM — R51.9 HEADACHE: Status: ACTIVE | Noted: 2017-07-16

## 2022-03-19 PROBLEM — F32.1 MDD (MAJOR DEPRESSIVE DISORDER), SINGLE EPISODE, MODERATE (HCC): Status: ACTIVE | Noted: 2018-08-13

## 2022-03-19 PROBLEM — R51.9 FACIAL PAIN, ACUTE: Status: ACTIVE | Noted: 2017-07-16

## 2022-03-19 PROBLEM — E66.9 OBESE: Status: ACTIVE | Noted: 2017-02-27

## 2022-03-20 PROBLEM — Z86.39 HISTORY OF HYPERTHYROIDISM: Status: ACTIVE | Noted: 2021-03-30

## 2022-03-20 PROBLEM — F41.9 ANXIETY DISORDER: Status: ACTIVE | Noted: 2018-08-13

## 2022-03-20 PROBLEM — E04.2 MULTIPLE THYROID NODULES: Status: ACTIVE | Noted: 2017-01-05

## 2022-04-20 ENCOUNTER — TRANSCRIBE ORDER (OUTPATIENT)
Dept: SCHEDULING | Age: 62
End: 2022-04-20

## 2022-04-20 DIAGNOSIS — Z12.31 VISIT FOR SCREENING MAMMOGRAM: Primary | ICD-10-CM

## 2025-03-31 ENCOUNTER — TELEPHONE (OUTPATIENT)
Dept: GASTROENTEROLOGY | Age: 65
End: 2025-03-31
